# Patient Record
Sex: MALE | Race: WHITE | NOT HISPANIC OR LATINO | Employment: FULL TIME | URBAN - METROPOLITAN AREA
[De-identification: names, ages, dates, MRNs, and addresses within clinical notes are randomized per-mention and may not be internally consistent; named-entity substitution may affect disease eponyms.]

---

## 2018-06-05 ENCOUNTER — OFFICE VISIT (OUTPATIENT)
Dept: FAMILY MEDICINE CLINIC | Facility: CLINIC | Age: 33
End: 2018-06-05

## 2018-06-05 VITALS
HEIGHT: 71 IN | TEMPERATURE: 98 F | BODY MASS INDEX: 31.08 KG/M2 | WEIGHT: 222 LBS | SYSTOLIC BLOOD PRESSURE: 100 MMHG | DIASTOLIC BLOOD PRESSURE: 70 MMHG

## 2018-06-05 DIAGNOSIS — G89.29 CHRONIC RIGHT SHOULDER PAIN: Primary | ICD-10-CM

## 2018-06-05 DIAGNOSIS — M25.511 CHRONIC RIGHT SHOULDER PAIN: Primary | ICD-10-CM

## 2018-06-05 PROCEDURE — 99202 OFFICE O/P NEW SF 15 MIN: CPT | Performed by: FAMILY MEDICINE

## 2018-06-05 RX ORDER — LIDOCAINE 40 MG/G
CREAM TOPICAL AS NEEDED
Qty: 30 G | Refills: 0
Start: 2018-06-05

## 2018-06-05 NOTE — PATIENT INSTRUCTIONS
Chronic right shoulder pain  I believe symptoms are coming from cervical disc or nerve root  I recommended use of Aspercreme with 4% lidocaine  I also recommended trial of ice or heat  And gave information on stretches  Unfortunately somewhat limited in treatment because he does not have insurance currently  Will consider formal physical therapy in the future

## 2018-06-05 NOTE — ASSESSMENT & PLAN NOTE
I believe symptoms are coming from cervical disc or nerve root  I recommended use of Aspercreme with 4% lidocaine  I also recommended trial of ice or heat  And gave information on stretches  Unfortunately somewhat limited in treatment because he does not have insurance currently  Will consider formal physical therapy in the future

## 2018-06-05 NOTE — PROGRESS NOTES
Assessment/Plan:    Chronic right shoulder pain  I believe symptoms are coming from cervical disc or nerve root  I recommended use of Aspercreme with 4% lidocaine  I also recommended trial of ice or heat  And gave information on stretches  Unfortunately somewhat limited in treatment because he does not have insurance currently  Will consider formal physical therapy in the future  Diagnoses and all orders for this visit:    Chronic right shoulder pain  -     lidocaine (LMX) 4 % cream; Apply topically as needed for mild pain          Subjective:      Patient ID: Marice Sandhoff is a 34 y o  male  Right shoulder pain has been going on for past 2 yrs  He denies injury to shoulder  Pain is constant and he notes numbness and tingling down arm  Numbness and burning ulnar side of arm  Occasional Advil  Smoking marijuana helps the pain  Lying on that side makes it worse  Notes slight weakness  strength  He works at [employer]  He does a lot of lifting at work  Arm hurts but is tolerable  He is right hand dominant  The following portions of the patient's history were reviewed and updated as appropriate: allergies, current medications, past family history, past medical history, past social history, past surgical history and problem list     Review of Systems   Constitutional: Negative for activity change, chills, fatigue and fever  HENT: Negative for congestion, ear pain, sinus pressure and sore throat  Eyes: Negative for pain and visual disturbance  Respiratory: Negative for cough, chest tightness, shortness of breath and wheezing  Cardiovascular: Negative for chest pain, palpitations and leg swelling  Gastrointestinal: Negative for abdominal pain, blood in stool, constipation, diarrhea, nausea and vomiting  Endocrine: Negative for polydipsia and polyuria  Genitourinary: Negative for difficulty urinating, dysuria, frequency and urgency  Musculoskeletal: Negative for arthralgias, joint swelling and myalgias  Skin: Negative for rash  Neurological: Positive for numbness  Negative for dizziness, weakness and headaches  Hematological: Negative for adenopathy  Does not bruise/bleed easily  Psychiatric/Behavioral: Negative for dysphoric mood  The patient is not nervous/anxious  Objective:      /70 (BP Location: Left arm, Patient Position: Sitting, Cuff Size: Standard)   Temp 98 °F (36 7 °C)   Ht 5' 11" (1 803 m)   Wt 101 kg (222 lb)   BMI 30 96 kg/m²          Physical Exam   Constitutional: He appears well-developed and well-nourished  Neck: Normal range of motion  Neck supple  Cardiovascular: Normal rate and regular rhythm  Pulmonary/Chest: Effort normal and breath sounds normal    Musculoskeletal:   Cervical spine is nontender  With full range of motion  There is mild tenderness over the right trapezius muscle  The shoulder has full range of motion in all planes  There is pain at extremes of motion  Negative empty can test, negative impingement test      Skin: Skin is warm and dry  Psychiatric: He has a normal mood and affect  His behavior is normal    Nursing note and vitals reviewed

## 2023-02-10 ENCOUNTER — TELEPHONE (OUTPATIENT)
Dept: PSYCHIATRY | Facility: CLINIC | Age: 38
End: 2023-02-10

## 2023-02-10 NOTE — TELEPHONE ENCOUNTER
Pt's wife called to request to schedule an appt for pt   Writer informed her that there are no openings available at this time and that we are working off a wait list  Love Burdick was explaining to her about the wait list and she interrupted to say that she is not interested in the wait list

## 2023-10-02 ENCOUNTER — DOCUMENTATION (OUTPATIENT)
Dept: BEHAVIORAL/MENTAL HEALTH CLINIC | Facility: CLINIC | Age: 38
End: 2023-10-02

## 2023-10-02 DIAGNOSIS — F43.29 ADJUSTMENT DISORDER WITH DISTURBANCE OF EMOTION: Primary | ICD-10-CM

## 2023-10-02 NOTE — PROGRESS NOTES
Received call from pt girlfriend, who was asking questions about having pt seen. Pt girlfriend reported that she was seen here at the [facility] and had a great experience, and was connected to outpatient. Requested to speak directly to pt, who was next to caller. Pt got on phone, reports that he is angry all of the time & can't maneuver around it. Pt reports calling intake about 1 year ago but declined wait list due to length it would take to get services. Provided pt with information about services the [facility] provides and provided pt with the address.  Also provided pt with the phone number to central intake & advised that he call to be placed on the list.

## 2023-10-03 ENCOUNTER — TELEPHONE (OUTPATIENT)
Dept: PSYCHIATRY | Facility: CLINIC | Age: 38
End: 2023-10-03

## 2023-11-03 ENCOUNTER — TELEPHONE (OUTPATIENT)
Dept: PSYCHIATRY | Facility: CLINIC | Age: 38
End: 2023-11-03